# Patient Record
Sex: FEMALE | Race: BLACK OR AFRICAN AMERICAN | NOT HISPANIC OR LATINO | URBAN - METROPOLITAN AREA
[De-identification: names, ages, dates, MRNs, and addresses within clinical notes are randomized per-mention and may not be internally consistent; named-entity substitution may affect disease eponyms.]

---
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
## 2025-05-27 NOTE — ED PROVIDER NOTE - CLINICAL SUMMARY MEDICAL DECISION MAKING FREE TEXT BOX
GUSTAVO COBOS is a 17 YEAR OLD FEMALE who presents to ER for CC of Ankle Pain/Injury after inversion injury after jumping which happened 1 hour ago. VSS. PE above. NVI but w/ lat malleolus TTP and Swollen. XR of affected area to evaluate for fracture or dislocation. Motrin for pain. Performing Laboratory: [id number] Lab Facility: [id number] Billing Type: Third-Party Bill Expected Date Of Service: 05/27/2025 Bill For Surgical Tray: no